# Patient Record
Sex: FEMALE | Race: WHITE | NOT HISPANIC OR LATINO | Employment: STUDENT | URBAN - METROPOLITAN AREA
[De-identification: names, ages, dates, MRNs, and addresses within clinical notes are randomized per-mention and may not be internally consistent; named-entity substitution may affect disease eponyms.]

---

## 2018-01-10 NOTE — RESULT NOTES
Message   Please call patient or her parents with results     Verified Results  ECHO COMPLETE WITH CONTRAST IF INDICATED [id number] 12:25PM Shweta Mcleod     Test Name Result Flag Reference   ECHO COMPLETE WITH CONTRAST IF INDICATED (Report)     Marissa Benton   [address]   [phone]     Transthoracic Echocardiogram   2D, M-mode, Doppler, and Color Doppler     Study date: 23-Aug-2016     Patient: Charles Burnett   MR number: [id number]   Account number: [de-identified]   : 2000   Age: 23 years   Gender: Female   Status: Outpatient   Location: Echo lab   Height: 70 in   Weight: 149 6 lb   BP: 120/ 62 mmHg     Indications: Chest Pain  Diagnoses: R07 9 - Chest pain, unspecified     Sonographer: Riccardo Steve [facility] AE-PE   Interpreting Physician: Katie Aquino MD   Primary Physician: Zahra Mccoy MD   Group: [facility]     SUMMARY     LEFT VENTRICLE:   Systolic function was normal by visual assessment  Ejection fraction was   estimated to be 60 %  There were no regional wall motion abnormalities  ATRIAL SEPTUM:   No defect or patent foramen ovale was identified  MITRAL VALVE:   There was trace regurgitation (physiologic)  TRICUSPID VALVE:   There was trace regurgitation (physiologic)  PULMONIC VALVE:   There was trace regurgitation (physiologic)  AORTA:   The root exhibited normal size  Normal origin and takeoff of the left and right   coronary arteries  PULMONARY ARTERIES:   No evidence of patent ductus arteriosus  HISTORY: PRIOR HISTORY: Patient has no history of cardiovascular disease  PROCEDURE: The procedure was performed in the echo lab  This was a routine   study  The transthoracic approach was used  The study included complete 2D   imaging, M-mode, complete spectral Doppler, and color Doppler  The heart rate   was 67 bpm, at the start of the study   Image quality was good      LEFT VENTRICLE: Size was normal  Systolic function was normal by visual   assessment  Ejection fraction was estimated to be 60 %  There were no regional   wall motion abnormalities  Wall thickness was normal  DOPPLER: The ratio of   early ventricular filling to atrial contraction velocities was within the   normal range  Left ventricular diastolic function parameters were normal      VENTRICULAR SEPTUM: The septum was intact  RIGHT VENTRICLE: The size was normal  Systolic function was normal  DOPPLER:   Systolic pressure was within the normal range  Estimated peak pressure was 25   mmHg  LEFT ATRIUM: Size was normal      ATRIAL SEPTUM: No defect or patent foramen ovale was identified  RIGHT ATRIUM: Size was normal      MITRAL VALVE: Valve structure was normal  There was normal leaflet separation  DOPPLER: The transmitral velocity was within the normal range  There was no   evidence for stenosis  There was trace regurgitation (physiologic)  AORTIC VALVE: The valve was trileaflet  Leaflets exhibited normal thickness and   normal cuspal separation  DOPPLER: Transaortic velocity was within the normal   range  There was no evidence for stenosis  There was no regurgitation  TRICUSPID VALVE: The valve structure was normal  There was normal leaflet   separation  DOPPLER: The transtricuspid velocity was within the normal range  There was no evidence for stenosis  There was trace regurgitation (physiologic)  PULMONIC VALVE: Leaflets exhibited normal thickness, no calcification, and   normal cuspal separation  DOPPLER: The transpulmonic velocity was within the   normal range  There was trace regurgitation (physiologic)  PERICARDIUM: There was no pericardial effusion  AORTA: The root exhibited normal size  Normal origin and takeoff of the left   and right coronary arteries  There was no evidence of coarctation, either   anatomically, or by Doppler flow parameters       SYSTEMIC VEINS: IVC: The inferior vena cava was normal in size and course  Respirophasic changes were normal      PULMONARY ARTERY: No evidence of patent ductus arteriosus  SYSTEM MEASUREMENT TABLES     2D   %FS: 30 24 %   Ao Diam: 2 53 cm   EDV(Teich): 97 05 ml   EF(Cube): 66 05 %   EF(Teich): 57 63 %   ESV(Cube): 32 92 ml   ESV(Teich): 41 12 ml   IVSd: 0 78 cm   LA Area: 13 5 cm2   LA Diam: 3 61 cm   LVEDV MOD A4C: 103 33 ml   LVEF MOD A4C: 57 84 %   LVESV MOD A4C: 43 57 ml   LVIDd: 4 59 cm   LVIDs: 3 21 cm   LVLd A4C: 7 21 cm   LVLs A4C: 5 86 cm   LVPWd: 0 78 cm   RA Area: 11 07 cm2   SI(Cube): 34 62 ml/m2   SI(Teich): 30 23 ml/m2   SV MOD A4C: 59 76 ml   SV(Cube): 64 05 ml   SV(Teich): 55 93 ml   rv diam: 2 75 cm     CW   TR Vmax: 2 28 m/s   TR maxP 85 mmHg     MM   TAPSE: 2 5 cm     PW   E': 0 13 m/s   E/E': 7 61   MV A Andrzej: 0 66 m/s   MV Dec Dauphin: 6 15 m/s2   MV DecT: 161 69 ms   MV E Andrzej: 0 99 m/s   MV E/A Ratio: 1 51     [employer] Accredited Echocardiography Laboratory     Prepared and electronically signed by     Reema Morse MD   Signed [id number] 15:47:02       Discussion/Summary   Your ECHO did not reveal any signficant findings   Please follow up with Primary Physician or Cardiologist to get clearance for Sports participation

## 2018-01-12 NOTE — RESULT NOTES
Message   Please call patient with results     Verified Results  STRESS TEST ONLY, EXERCISE [id number] 12:25PM Cj Jang     Test Name Result Flag Reference   STRESS TEST ONLY, EXERCISE (Report)     Jaime 175   [address]   [phone]     Stress Electrocardiography during Exercise     Name: Samra Elizabeth   MR #: [id number]   Account #: [de-identified]   Study date: 2016   : 2000   Age: 23 years   Gender: Female   Height: 70 in   Weight: 150 lb   BSA: 1 85 m squared     Allergies: ALLERGIES NOT ON FILE     RN: Donna Del Valle   Referring Physician: Serenity Gallegos: [facility]   Report Prepared By[de-identified] Donna Del Valle   Interpreting Physician: Sincere Demarco MD     CLINICAL QUESTION: Detection of coronary artery disease  HISTORY: The patient is a 23year old  female  Chest pain status: no   chest pain  Other symptoms: dyspnea  PHYSICAL EXAM: Baseline physical exam screening: normal lung exam      REST ECG: Normal sinus rhythm  No ST or T wave abnormality  PROCEDURE: Treadmill exercise testing was performed, using the Arnulfo protocol  Stress electrocardiographic evaluation was performed  ARNULFO PROTOCOL:   HR bpm SBP mmHg DBP mmHg Symptoms ST change Rhythm/conduct   Baseline 98 108 64 none none NSR   Stage 1 106 -- -- none none sinus tach   Stage 2 122 124 64 none none sinus tach   Stage 3 151 128 70 none none sinus tach   Stage 4 171 134 74 mild dyspnea none sinus tach   Stage 5 193 138 76 moderate dyspnea none sinus tach   Recovery 1 101 132 58 subsiding, dyspnea none sinus tach   Recovery 2 98 112 64 none none NSR   No medications or fluids given  STRESS SUMMARY: Pre sat 99% peak 99% Duration of exercise was 10 min  The   patient exercised to protocol stage 5  Maximal work rate was 17 1 METs     Functional capacity was normal  Maximal heart rate during stress was 196 bpm (   97 % of maximal predicted heart rate)  The heart rate response to stress was   normal  There was normal resting blood pressure with an appropriate response to   stress  The rate-pressure product for the peak heart rate and blood pressure   was 31665  There was no chest pain during stress  The stress test was   terminated due to achievement of maximal (symptom limited) exercise  The stress   ECG was negative for ischemia  There were no stress arrhythmias or conduction   abnormalities  SUMMARY:   - Stress results: Duration of exercise was 10 min  Target heart rate was   achieved  There was no chest pain during stress  - ECG conclusions: The stress ECG was negative for ischemia  IMPRESSIONS: Normal study after maximal exercise with reproduction of symptoms  Prepared and signed by     Queta Gray MD   Signed 08/23/2016 16:51:56       Discussion/Summary   Your stress test result was negative; please follow up with your Primary Physican or Cardiologist for further clearance  Thank you!

## 2018-05-07 ENCOUNTER — TRANSCRIBE ORDERS (OUTPATIENT)
Dept: ADMINISTRATIVE | Age: 18
End: 2018-05-07

## 2018-05-07 ENCOUNTER — APPOINTMENT (OUTPATIENT)
Dept: LAB | Age: 18
End: 2018-05-07
Attending: PREVENTIVE MEDICINE

## 2018-05-07 DIAGNOSIS — Z02.1 PRE-EMPLOYMENT EXAMINATION: ICD-10-CM

## 2018-05-07 DIAGNOSIS — Z02.1 PRE-EMPLOYMENT EXAMINATION: Primary | ICD-10-CM

## 2018-05-07 PROCEDURE — 36415 COLL VENOUS BLD VENIPUNCTURE: CPT

## 2018-05-07 PROCEDURE — 86480 TB TEST CELL IMMUN MEASURE: CPT

## 2018-05-09 LAB
ANNOTATION COMMENT IMP: NORMAL
GAMMA INTERFERON BACKGROUND BLD IA-ACNC: 0.11 IU/ML
M TB IFN-G BLD-IMP: NEGATIVE
M TB IFN-G CD4+ BCKGRND COR BLD-ACNC: 0.01 IU/ML
M TB IFN-G CD4+ T-CELLS BLD-ACNC: 0.12 IU/ML
MITOGEN IGNF BLD-ACNC: 7.64 IU/ML
QUANTIFERON-TB GOLD IN TUBE: NORMAL
SERVICE CMNT-IMP: NORMAL

## 2019-09-06 ENCOUNTER — OFFICE VISIT (OUTPATIENT)
Dept: OBGYN CLINIC | Facility: OTHER | Age: 19
End: 2019-09-06
Payer: COMMERCIAL

## 2019-09-06 ENCOUNTER — HOSPITAL ENCOUNTER (OUTPATIENT)
Dept: MRI IMAGING | Facility: HOSPITAL | Age: 19
Discharge: HOME | End: 2019-09-06
Payer: COMMERCIAL

## 2019-09-06 ENCOUNTER — APPOINTMENT (OUTPATIENT)
Dept: RADIOLOGY | Facility: OTHER | Age: 19
End: 2019-09-06
Payer: COMMERCIAL

## 2019-09-06 VITALS
WEIGHT: 152 LBS | DIASTOLIC BLOOD PRESSURE: 76 MMHG | HEART RATE: 56 BPM | HEIGHT: 70 IN | BODY MASS INDEX: 21.76 KG/M2 | SYSTOLIC BLOOD PRESSURE: 116 MMHG

## 2019-09-06 DIAGNOSIS — R29.898 KNEE CLICKING: ICD-10-CM

## 2019-09-06 DIAGNOSIS — M25.561 RIGHT KNEE PAIN, UNSPECIFIED CHRONICITY: Primary | ICD-10-CM

## 2019-09-06 DIAGNOSIS — M25.561 RIGHT KNEE PAIN, UNSPECIFIED CHRONICITY: ICD-10-CM

## 2019-09-06 PROCEDURE — 73564 X-RAY EXAM KNEE 4 OR MORE: CPT

## 2019-09-06 PROCEDURE — 99204 OFFICE O/P NEW MOD 45 MIN: CPT | Performed by: ORTHOPAEDIC SURGERY

## 2019-09-06 PROCEDURE — 73721 MRI JNT OF LWR EXTRE W/O DYE: CPT

## 2019-09-06 RX ORDER — NAPROXEN 500 MG/1
500 TABLET ORAL 2 TIMES DAILY PRN
Qty: 20 TABLET | Refills: 0 | Status: SHIPPED | OUTPATIENT
Start: 2019-09-06

## 2019-09-06 RX ORDER — NORETHINDRONE ACETATE AND ETHINYL ESTRADIOL AND FERROUS FUMARATE 1MG-20(24)
1 KIT ORAL DAILY
COMMUNITY
Start: 2018-07-24

## 2019-09-06 RX ORDER — ALBUTEROL SULFATE 90 UG/1
1 AEROSOL, METERED RESPIRATORY (INHALATION) EVERY 6 HOURS PRN
COMMUNITY

## 2019-09-06 RX ORDER — NORETHINDRONE ACETATE AND ETHINYL ESTRADIOL, AND FERROUS FUMARATE 1MG-20(24)
KIT ORAL
COMMUNITY
Start: 2019-06-26

## 2019-09-09 ENCOUNTER — OFFICE VISIT (OUTPATIENT)
Dept: OBGYN CLINIC | Facility: OTHER | Age: 19
End: 2019-09-09
Payer: COMMERCIAL

## 2019-09-09 VITALS
HEIGHT: 70 IN | DIASTOLIC BLOOD PRESSURE: 76 MMHG | HEART RATE: 58 BPM | SYSTOLIC BLOOD PRESSURE: 126 MMHG | WEIGHT: 151.9 LBS | BODY MASS INDEX: 21.75 KG/M2

## 2019-09-09 DIAGNOSIS — S83.411D SPRAIN OF MEDIAL COLLATERAL LIGAMENT OF RIGHT KNEE, SUBSEQUENT ENCOUNTER: ICD-10-CM

## 2019-09-09 DIAGNOSIS — S80.01XD CONTUSION OF RIGHT KNEE, SUBSEQUENT ENCOUNTER: Primary | ICD-10-CM

## 2019-09-09 PROBLEM — S80.01XA CONTUSION OF RIGHT KNEE: Status: ACTIVE | Noted: 2019-09-09

## 2019-09-09 PROBLEM — S83.411A SPRAIN OF MEDIAL COLLATERAL LIGAMENT OF RIGHT KNEE: Status: ACTIVE | Noted: 2019-09-09

## 2019-09-09 PROCEDURE — 99213 OFFICE O/P EST LOW 20 MIN: CPT | Performed by: ORTHOPAEDIC SURGERY

## 2019-09-09 NOTE — PROGRESS NOTES
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   I have suggested her to avoid right lower extremity impact type/jumping exercises over the next week and then gradually progress as tolerated  I will follow up with her on an as-needed basis at the [facility] athletic training room  Subjective:     Patient ID: Tawnya Foster is a 22 y o  female  Chief Complaint:  Follow-up right knee injury    HPI  Ramo Pelayo is here today for a follow-up of her right knee injury  She was previously seen in this regard on 9/06/2019 and an MRI of the right knee was obtained to exclude internal derangement, specifically a meniscal tear  This has revealed a grade 1 MCL tear along with bone contusion pattern indicating hyperextension injury with associated small joint effusion  Bone contusions were noted in the anteromedial tibial plateau and anteromedial aspect of the medial femoral condyle  No other internal derangement of the right knee noted  Symptomatically, most of her discomfort is anterior with only mild achiness of the right medial knee  She is able to weightbear and ambulate with mild difficulty and does have discomfort with knee extension  No new injuries reported       Social History     Occupational History    Not on file   Tobacco Use    Smoking status: Never Smoker    Smokeless tobacco: Never Used   Substance and Sexual Activity    Alcohol use: Not on file    Drug use: Not on file    Sexual activity: Not on file      Review of Systems Constitutional: Negative  HENT: Negative  Eyes: Negative  Respiratory: Negative  Cardiovascular: Negative  Gastrointestinal: Negative  Endocrine: Negative  Genitourinary: Negative  Skin: Negative  Allergic/Immunologic: Negative  Neurological: Negative  Hematological: Negative  Psychiatric/Behavioral: Negative  Objective:     Ortho ExamPhysical Exam   Constitutional: She is oriented to person, place, and time  She appears well-developed and well-nourished  HENT:   Head: Normocephalic and atraumatic  Eyes: Pupils are equal, round, and reactive to light  Conjunctivae are normal    Cardiovascular: Normal rate and regular rhythm  Pulmonary/Chest: Effort normal  No respiratory distress  Neurological: She is alert and oriented to person, place, and time  No cranial nerve deficit  Skin: Skin is warm and dry  No erythema  Psychiatric: She has a normal mood and affect  Her behavior is normal  Judgment and thought content normal    Nursing note and vitals reviewed  Right knee exam:  No obvious clinical effusion noted  There is tenderness to palpation both in the anteromedial and anterolateral joint line  There is tenderness to palpation of the medial femoral condyle in the anterior aspect as well as the anterior aspect of the medial tibial plateau  Full range of right knee motion  There is discomfort with terminal extension passively  Negative medial and lateral Carmelo's  Mild discomfort but no laxity on valgus stress testing  Negative varus stress testing  Negative Lachman  Negative anterior and posterior drawer  I have personally reviewed pertinent films in PACS and my interpretation is As noted in the HPI

## 2019-09-09 NOTE — PROGRESS NOTES
Assessment:       1  Right knee pain, unspecified chronicity    2  Knee clicking          Plan:        Explained my current clinical findings and reviewed radiological findings with Yun Hunter  I will request an MRI of her right knee to exclude an underlying internal derangement, specifically to exclude a meniscal tear  In the interim, she is advised to continue with range-of-motion exercises of the right knee as well as knee strengthening exercises  She suggested to avoid running or jumping type activities at this time until reviewed  I will also prescribe her oral naproxen to be taken on an as-needed basis for pain control  Follow-up after right knee MRI  Subjective:     Patient ID: Jennifer Nguyen is a 22 y o  female  Chief Complaint:  Right knee pain    HPI  Yun Hunter is a [employer] field  who is here today for evaluation of acute onset right knee pain following a sports related injury on 8/30/2019 while playing field hockey  She had a direct impact on the lateral aspect of her right knee leading to a valgus type stress and fell with hyper extension of the right knee  Thereafter, she had acute onset right knee pain and was unable to continue play  She denies having a snapping or popping sensation of the right knee  She is able to weightbear and ambulate but reports moderate intensity pain on the anterior/anterolateral aspect of the right knee  She also complains of some mild anteromedial knee pain  She denies any sense of instability  Pain is nonradiating  Reports some clicking but denies any locking episodes  No history of previous right knee injuries  Social History     Occupational History    Not on file   Tobacco Use    Smoking status: Never Smoker    Smokeless tobacco: Never Used   Substance and Sexual Activity    Alcohol use: Not on file    Drug use: Not on file    Sexual activity: Not on file      Review of Systems   Constitutional: Negative      HENT: Negative  Eyes: Negative  Respiratory: Negative  Cardiovascular: Negative  Gastrointestinal: Negative  Endocrine: Negative  Genitourinary: Negative  Skin: Negative  Allergic/Immunologic: Negative  Neurological: Negative  Hematological: Negative  Psychiatric/Behavioral: Negative  Objective:     Ortho ExamPhysical Exam   Constitutional: She is oriented to person, place, and time  She appears well-developed and well-nourished  HENT:   Head: Normocephalic and atraumatic  Eyes: Pupils are equal, round, and reactive to light  Conjunctivae are normal    Cardiovascular: Normal rate and regular rhythm  Pulmonary/Chest: Effort normal  No respiratory distress  Neurological: She is alert and oriented to person, place, and time  No cranial nerve deficit  Skin: Skin is warm and dry  No erythema  Psychiatric: She has a normal mood and affect  Her behavior is normal  Judgment and thought content normal    Nursing note and vitals reviewed  Right knee exam:  Mild swelling  No overlying erythema or palpable warmth  Tender to palpation over the anterolateral joint line as well as some tenderness over the medial femoral condyle and medial tibial plateau  Full range of active flexion without discomfort  Increased discomfort with terminal extension and a positive bounce test   Negative medial Carmelo's and equivocal lateral Carmelo's in terminal extension  Negative varus stress test   Minimal discomfort with valgus stress test without laxity  Negative Lachman  Negative anterior and posterior drawer  I have personally reviewed pertinent films in PACS and my interpretation is Plain radiograph of the right knee does not reveal any acute osseous injury  Elissa Patterson

## 2019-09-09 NOTE — LETTER
September 9, 2019     Patient: Teddy Paulino Jj   YOB: 2000   Date of Visit: 9/9/2019       To Whom it May Concern:    Sean Cortez is under my professional care  She was seen in my office on 9/9/2019  She is suggested to do right knee range of motion and quadriceps strengthening exercises  Avoid high impact right lower extremity exercises or jumping over the next week and then gradually progress as comfortable  Also suggest to wear a right hinged knee brace during physical/sports activity  May return back to sports activities as comfortable after 1 week with wearing the right knee brace  If you have any questions or concerns, please don't hesitate to call  Sincerely,          Juan Pablo Ravi MD        CC: Estelle Gardner

## 2020-12-29 ENCOUNTER — IMMUNIZATIONS (OUTPATIENT)
Dept: FAMILY MEDICINE CLINIC | Facility: HOSPITAL | Age: 20
End: 2020-12-29
Payer: COMMERCIAL

## 2020-12-29 DIAGNOSIS — Z23 ENCOUNTER FOR IMMUNIZATION: ICD-10-CM

## 2020-12-29 PROCEDURE — 0011A SARS-COV-2 / COVID-19 MRNA VACCINE (MODERNA) 100 MCG: CPT

## 2020-12-29 PROCEDURE — 91301 SARS-COV-2 / COVID-19 MRNA VACCINE (MODERNA) 100 MCG: CPT

## 2021-01-04 ENCOUNTER — TELEMEDICINE (OUTPATIENT)
Dept: INTERNAL MEDICINE CLINIC | Facility: CLINIC | Age: 21
End: 2021-01-04
Payer: COMMERCIAL

## 2021-01-04 DIAGNOSIS — B34.9 VIRAL INFECTION, UNSPECIFIED: ICD-10-CM

## 2021-01-04 DIAGNOSIS — B34.9 VIRAL INFECTION, UNSPECIFIED: Primary | ICD-10-CM

## 2021-01-04 PROCEDURE — U0003 INFECTIOUS AGENT DETECTION BY NUCLEIC ACID (DNA OR RNA); SEVERE ACUTE RESPIRATORY SYNDROME CORONAVIRUS 2 (SARS-COV-2) (CORONAVIRUS DISEASE [COVID-19]), AMPLIFIED PROBE TECHNIQUE, MAKING USE OF HIGH THROUGHPUT TECHNOLOGIES AS DESCRIBED BY CMS-2020-01-R: HCPCS | Performed by: INTERNAL MEDICINE

## 2021-01-04 PROCEDURE — 99213 OFFICE O/P EST LOW 20 MIN: CPT | Performed by: INTERNAL MEDICINE

## 2021-01-04 NOTE — PROGRESS NOTES
COVID-19 Virtual Visit     Assessment/Plan:    Problem List Items Addressed This Visit        Other    Viral infection, unspecified - Primary     Recommended tylenol up to 1h every 6hs as needed for pain or fever  MV daily for now  Encouraged oral hydration  Patient was instructed to self-isolate until results are back  Do not share utensils, towels, and have  bathroom  Patient should wipe all the surfaces that he comes in contact with, and use mask if cannot stay more than 6 ft apart from other people  Patient was advised to call us if there is any new or worsening symptoms including fever, productive cough or shortness of breath  If patient feels sick enough to call 911  Patient verbalized understanding and agree with the plan  Will follow up in 1 days with results of PCR  Relevant Orders    Novel Coronavirus (COVID-19), PCR [facility] - Collected at [facility] or [facility]         Disposition:     I referred patient to one of our centralized sites for a COVID-19 swab  I have spent 16 minutes directly with the patient  Greater than 50% of this time was spent in counseling/coordination of care regarding: patient and family education  Encounter provider Mauro Krishnan MD    Provider located at [address] [place] [address]  [phone]    Recent Visits  No visits were found meeting these conditions  Showing recent visits within past 7 days and meeting all other requirements     Today's Visits  Date Type Provider Dept   01/04/21 Telemedicine Mauro Krishnan MD [facility]   Showing today's visits and meeting all other requirements     Future Appointments  No visits were found meeting these conditions     Showing future appointments within next 150 days and meeting all other requirements      This virtual check-in was done via Microsoft Teams and patient was informed that this is a secure, HIPAA-compliant platform  She agrees to proceed  Patient agrees to participate in a virtual check in via telephone or video visit instead of presenting to the office to address urgent/immediate medical needs  Patient is aware this is a billable service  After connecting through [facility], the patient was identified by name and date of birth  Marcello Mandujano was informed that this was a telemedicine visit and that the exam was being conducted confidentially over secure lines  My office door was closed  No one else was in the room  Marcello Mandujano acknowledged consent and understanding of privacy and security of the telemedicine visit  I informed the patient that I have reviewed her record in Epic and presented the opportunity for her to ask any questions regarding the visit today  The patient agreed to participate  Subjective:   Marcello Mandujano is a 23 y o  female who is concerned about COVID-19  Patient's symptoms include chills, fatigue, abdominal pain, nausea, vomiting, diarrhea, myalgias and headache  Patient denies fever, malaise, congestion, rhinorrhea, sore throat, anosmia, loss of taste, cough, shortness of breath and chest tightness       Exposure:   Contact with a person who is under investigation (PUI) for or who is positive for COVID-19 within the last 14 days?: No    Hospitalized recently for fever and/or lower respiratory symptoms?: No      Currently a healthcare worker that is involved in direct patient care?: Yes      Works in a special setting where the risk of COVID-19 transmission may be high? (this may include long-term care, correctional and [facility] facilities; homeless shelters; assisted-living facilities and group homes ): No      Resident in a special setting where the risk of COVID-19 transmission may be high? (this may include long-term care, correctional and [facility] facilities; homeless shelters; assisted-living facilities and group homes ): No      Patient works at [employer] with direct contact with COVID-19 patients  No results found for: Shanti Brownlee, LOVELY, [address]  Past Medical History:   Diagnosis Date    Asthma     sports induced     No past surgical history on file  Current Outpatient Medications   Medication Sig Dispense Refill    albuterol (PROVENTIL HFA,VENTOLIN HFA) 90 mcg/act inhaler Inhale 1 puff every 6 (six) hours as needed      JANET 24 FE 1-20 MG-MCG(24) per tablet       naproxen (NAPROSYN) 500 mg tablet Take 1 tablet (500 mg total) by mouth 2 (two) times a day as needed for moderate pain 20 tablet 0    norethindrone-ethinyl estradiol-ferrous fumarate (LOESTIN 24 FE) 1-20 MG-MCG(24) per tablet Take 1 tablet by mouth daily       No current facility-administered medications for this visit  Allergies   Allergen Reactions    Penicillins Rash       Review of Systems   Constitutional: Positive for chills and fatigue  Negative for fever  HENT: Negative for congestion, rhinorrhea and sore throat  Respiratory: Negative for cough, chest tightness and shortness of breath  Gastrointestinal: Positive for abdominal pain, diarrhea, nausea and vomiting  Musculoskeletal: Positive for myalgias  Skin: Negative for rash  Neurological: Positive for headaches  Psychiatric/Behavioral: Negative for confusion  The patient is not nervous/anxious  Objective: There were no vitals filed for this visit  Patient sounded in no distress, no conversational dyspnea, no audible wheezing or cough during our conversation  Patient was alert and oriented, able to answer all questions appropriately  VIRTUAL VISIT DISCLAIMER    Kong Lee acknowledges that she has consented to an online visit or consultation   She understands that the online visit is based solely on information provided by her, and that, in the absence of a face-to-face physical evaluation by the physician, the diagnosis she receives is both limited and provisional in terms of accuracy and completeness  This is not intended to replace a full medical face-to-face evaluation by the physician  Brennan Gardner understands and accepts these terms

## 2021-01-04 NOTE — ASSESSMENT & PLAN NOTE
Recommended tylenol up to 1h every 6hs as needed for pain or fever  MV daily for now  Encouraged oral hydration  Patient was instructed to self-isolate until results are back  Do not share utensils, towels, and have  bathroom  Patient should wipe all the surfaces that he comes in contact with, and use mask if cannot stay more than 6 ft apart from other people  Patient was advised to call us if there is any new or worsening symptoms including fever, productive cough or shortness of breath  If patient feels sick enough to call 911  Patient verbalized understanding and agree with the plan  Will follow up in 1 days with results of PCR

## 2021-01-05 ENCOUNTER — TELEMEDICINE (OUTPATIENT)
Dept: INTERNAL MEDICINE CLINIC | Facility: CLINIC | Age: 21
End: 2021-01-05
Payer: COMMERCIAL

## 2021-01-05 DIAGNOSIS — B34.9 VIRAL INFECTION, UNSPECIFIED: Primary | ICD-10-CM

## 2021-01-05 LAB — SARS-COV-2 RNA SPEC QL NAA+PROBE: NOT DETECTED

## 2021-01-05 PROCEDURE — 99212 OFFICE O/P EST SF 10 MIN: CPT | Performed by: INTERNAL MEDICINE

## 2021-01-05 PROCEDURE — 1036F TOBACCO NON-USER: CPT | Performed by: INTERNAL MEDICINE

## 2021-01-05 NOTE — ASSESSMENT & PLAN NOTE
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

## 2021-01-05 NOTE — PROGRESS NOTES
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  She was tested yesterday for COVID 19 and was negative  She reports that her symptoms significantly improved  Denies fever, chills ,sweating, cough, SOB  She hasn't been using tylenol  She is pass the period for isolation since her symptoms onset and feels ready to go back to work  We offered her a work clearance letter but she stated that she does not need one  We advised the patient to call our office if she needs anything or her symptoms worsen  Reason for visit is   Chief Complaint   Patient presents with    Virtual Brief Visit        Encounter provider Celio Harley MD    Provider located at [address] [place], [place] [phone]    Recent Visits  No visits were found meeting these conditions  Showing recent visits within past 7 days and meeting all other requirements     Today's Visits  Date Type Provider Dept   01/05/21 Carmelo Holly MD  [facility]   Showing today's visits and meeting all other requirements     Future Appointments  No visits were found meeting these conditions  Showing future appointments within next 150 days and meeting all other requirements        After connecting through telephone, the patient was identified by name and date of birth  Kym Ott was informed that this is a telemedicine visit and that the visit is being conducted through telephone  My office door was closed  Other methods to assure confidentiality were taken  No one else was in the room    She acknowledged consent and understanding of privacy and security of the platform  The patient has agreed to participate and understands she can discontinue the visit at any time  Patient is aware this is a billable service  Subjective    Estelle Hope is a 23 y o  female   Patient is a 23year old lady that started to have symptoms of chills, fatigue, nausea, vomiting and diarrhea about a week ago  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  She was tested yesterday for COVID 19 and was negative  She reports that her symptoms significantly improved  Denies fever, chills ,sweating, cough, SOB  She hasn't been using tylenol  She is pass the period for isolation since her symptoms onset and feels ready to go back to work  We offered her a work clearance letter but she stated that she does not need one  We advised the patient to call our office if she needs anything or her symptoms worsen  Past Medical History:   Diagnosis Date    Asthma     sports induced       No past surgical history on file  Current Outpatient Medications   Medication Sig Dispense Refill    albuterol (PROVENTIL HFA,VENTOLIN HFA) 90 mcg/act inhaler Inhale 1 puff every 6 (six) hours as needed      JANET 24 FE 1-20 MG-MCG(24) per tablet       naproxen (NAPROSYN) 500 mg tablet Take 1 tablet (500 mg total) by mouth 2 (two) times a day as needed for moderate pain 20 tablet 0    norethindrone-ethinyl estradiol-ferrous fumarate (LOESTIN 24 FE) 1-20 MG-MCG(24) per tablet Take 1 tablet by mouth daily       No current facility-administered medications for this visit  Allergies   Allergen Reactions    Penicillins Rash       Review of Systems   Constitutional: Negative for activity change, appetite change, chills, diaphoresis and fever  HENT: Negative for congestion and sore throat  Respiratory: Negative for cough and shortness of breath      Cardiovascular: Negative for chest pain, palpitations and leg swelling  Gastrointestinal: Positive for diarrhea and nausea  Negative for abdominal pain and vomiting  Genitourinary: Negative for difficulty urinating and dysuria  Musculoskeletal: Positive for myalgias  Negative for arthralgias  Skin: Negative for rash  Neurological: Negative for dizziness, light-headedness and headaches  Psychiatric/Behavioral: Negative for agitation  The patient is not nervous/anxious  There were no vitals filed for this visit  I spent 12 minutes directly with the patient during this visit    Samuel Price acknowledges that she has consented to an online visit or consultation  She understands that the online visit is based solely on information provided by her, and that, in the absence of a face-to-face physical evaluation by the physician, the diagnosis she receives is both limited and provisional in terms of accuracy and completeness  This is not intended to replace a full medical face-to-face evaluation by the physician  Nikkie Gardner understands and accepts these terms

## 2021-01-24 ENCOUNTER — IMMUNIZATIONS (OUTPATIENT)
Dept: FAMILY MEDICINE CLINIC | Facility: HOSPITAL | Age: 21
End: 2021-01-24

## 2021-01-24 DIAGNOSIS — Z23 ENCOUNTER FOR IMMUNIZATION: Primary | ICD-10-CM

## 2021-01-24 PROCEDURE — 91301 SARS-COV-2 / COVID-19 MRNA VACCINE (MODERNA) 100 MCG: CPT

## 2021-01-24 PROCEDURE — 0012A SARS-COV-2 / COVID-19 MRNA VACCINE (MODERNA) 100 MCG: CPT
